# Patient Record
Sex: FEMALE | Race: ASIAN | Employment: UNEMPLOYED | URBAN - METROPOLITAN AREA
[De-identification: names, ages, dates, MRNs, and addresses within clinical notes are randomized per-mention and may not be internally consistent; named-entity substitution may affect disease eponyms.]

---

## 2020-10-22 ENCOUNTER — TELEPHONE (OUTPATIENT)
Dept: PRIMARY CARE CLINIC | Age: 55
End: 2020-10-22

## 2020-10-22 ENCOUNTER — OFFICE VISIT (OUTPATIENT)
Dept: PRIMARY CARE CLINIC | Age: 55
End: 2020-10-22
Payer: COMMERCIAL

## 2020-10-22 VITALS
SYSTOLIC BLOOD PRESSURE: 124 MMHG | OXYGEN SATURATION: 99 % | BODY MASS INDEX: 21.14 KG/M2 | WEIGHT: 112 LBS | RESPIRATION RATE: 14 BRPM | HEART RATE: 90 BPM | HEIGHT: 61 IN | TEMPERATURE: 97.5 F | DIASTOLIC BLOOD PRESSURE: 80 MMHG

## 2020-10-22 DIAGNOSIS — Z00.00 PREVENTATIVE HEALTH CARE: ICD-10-CM

## 2020-10-22 LAB
ALBUMIN SERPL-MCNC: 4.3 G/DL (ref 3.5–4.6)
ALP BLD-CCNC: 81 U/L (ref 40–130)
ALT SERPL-CCNC: 10 U/L (ref 0–33)
ANION GAP SERPL CALCULATED.3IONS-SCNC: 10 MEQ/L (ref 9–15)
AST SERPL-CCNC: 17 U/L (ref 0–35)
BASOPHILS ABSOLUTE: 0.1 K/UL (ref 0–0.2)
BASOPHILS RELATIVE PERCENT: 0.7 %
BILIRUB SERPL-MCNC: 0.4 MG/DL (ref 0.2–0.7)
BUN BLDV-MCNC: 9 MG/DL (ref 6–20)
CALCIUM SERPL-MCNC: 9.2 MG/DL (ref 8.5–9.9)
CHLORIDE BLD-SCNC: 103 MEQ/L (ref 95–107)
CHOLESTEROL, TOTAL: 182 MG/DL (ref 0–199)
CO2: 25 MEQ/L (ref 20–31)
CREAT SERPL-MCNC: 0.6 MG/DL (ref 0.5–0.9)
EOSINOPHILS ABSOLUTE: 0.1 K/UL (ref 0–0.7)
EOSINOPHILS RELATIVE PERCENT: 0.8 %
GFR AFRICAN AMERICAN: >60
GFR NON-AFRICAN AMERICAN: >60
GLOBULIN: 2.8 G/DL (ref 2.3–3.5)
GLUCOSE BLD-MCNC: 92 MG/DL (ref 70–99)
HBA1C MFR BLD: 5.6 % (ref 4.8–5.9)
HCT VFR BLD CALC: 40.4 % (ref 37–47)
HDLC SERPL-MCNC: 65 MG/DL (ref 40–59)
HEMOGLOBIN: 13.5 G/DL (ref 12–16)
LDL CHOLESTEROL CALCULATED: 103 MG/DL (ref 0–129)
LYMPHOCYTES ABSOLUTE: 2.5 K/UL (ref 1–4.8)
LYMPHOCYTES RELATIVE PERCENT: 30.5 %
MCH RBC QN AUTO: 31 PG (ref 27–31.3)
MCHC RBC AUTO-ENTMCNC: 33.5 % (ref 33–37)
MCV RBC AUTO: 92.8 FL (ref 82–100)
MONOCYTES ABSOLUTE: 0.6 K/UL (ref 0.2–0.8)
MONOCYTES RELATIVE PERCENT: 8 %
NEUTROPHILS ABSOLUTE: 4.9 K/UL (ref 1.4–6.5)
NEUTROPHILS RELATIVE PERCENT: 60 %
PDW BLD-RTO: 13.4 % (ref 11.5–14.5)
PLATELET # BLD: 299 K/UL (ref 130–400)
POTASSIUM SERPL-SCNC: 4.6 MEQ/L (ref 3.4–4.9)
RBC # BLD: 4.36 M/UL (ref 4.2–5.4)
SODIUM BLD-SCNC: 138 MEQ/L (ref 135–144)
TOTAL PROTEIN: 7.1 G/DL (ref 6.3–8)
TRIGL SERPL-MCNC: 71 MG/DL (ref 0–150)
TSH REFLEX: 0.93 UIU/ML (ref 0.44–3.86)
WBC # BLD: 8.1 K/UL (ref 4.8–10.8)

## 2020-10-22 PROCEDURE — G8420 CALC BMI NORM PARAMETERS: HCPCS | Performed by: INTERNAL MEDICINE

## 2020-10-22 PROCEDURE — G8484 FLU IMMUNIZE NO ADMIN: HCPCS | Performed by: INTERNAL MEDICINE

## 2020-10-22 PROCEDURE — 1036F TOBACCO NON-USER: CPT | Performed by: INTERNAL MEDICINE

## 2020-10-22 PROCEDURE — 3017F COLORECTAL CA SCREEN DOC REV: CPT | Performed by: INTERNAL MEDICINE

## 2020-10-22 PROCEDURE — 99204 OFFICE O/P NEW MOD 45 MIN: CPT | Performed by: INTERNAL MEDICINE

## 2020-10-22 PROCEDURE — G8427 DOCREV CUR MEDS BY ELIG CLIN: HCPCS | Performed by: INTERNAL MEDICINE

## 2020-10-22 RX ORDER — CITALOPRAM 10 MG/1
10 TABLET ORAL DAILY
Qty: 30 TABLET | Refills: 3 | Status: SHIPPED | OUTPATIENT
Start: 2020-10-22

## 2020-10-22 RX ORDER — HYDROXYZINE PAMOATE 25 MG/1
25 CAPSULE ORAL 3 TIMES DAILY PRN
Qty: 30 CAPSULE | Refills: 1 | Status: SHIPPED | OUTPATIENT
Start: 2020-10-22

## 2020-10-22 RX ORDER — IBUPROFEN 200 MG
CAPSULE ORAL DAILY
COMMUNITY

## 2020-10-22 SDOH — HEALTH STABILITY: MENTAL HEALTH: HOW OFTEN DO YOU HAVE A DRINK CONTAINING ALCOHOL?: NEVER

## 2020-10-22 SDOH — ECONOMIC STABILITY: INCOME INSECURITY: HOW HARD IS IT FOR YOU TO PAY FOR THE VERY BASICS LIKE FOOD, HOUSING, MEDICAL CARE, AND HEATING?: NOT HARD AT ALL

## 2020-10-22 SDOH — ECONOMIC STABILITY: TRANSPORTATION INSECURITY
IN THE PAST 12 MONTHS, HAS LACK OF TRANSPORTATION KEPT YOU FROM MEETINGS, WORK, OR FROM GETTING THINGS NEEDED FOR DAILY LIVING?: NO

## 2020-10-22 SDOH — ECONOMIC STABILITY: TRANSPORTATION INSECURITY
IN THE PAST 12 MONTHS, HAS THE LACK OF TRANSPORTATION KEPT YOU FROM MEDICAL APPOINTMENTS OR FROM GETTING MEDICATIONS?: NO

## 2020-10-22 SDOH — ECONOMIC STABILITY: FOOD INSECURITY: WITHIN THE PAST 12 MONTHS, THE FOOD YOU BOUGHT JUST DIDN'T LAST AND YOU DIDN'T HAVE MONEY TO GET MORE.: NEVER TRUE

## 2020-10-22 SDOH — ECONOMIC STABILITY: FOOD INSECURITY: WITHIN THE PAST 12 MONTHS, YOU WORRIED THAT YOUR FOOD WOULD RUN OUT BEFORE YOU GOT MONEY TO BUY MORE.: NEVER TRUE

## 2020-10-22 ASSESSMENT — PATIENT HEALTH QUESTIONNAIRE - PHQ9
SUM OF ALL RESPONSES TO PHQ QUESTIONS 1-9: 2
1. LITTLE INTEREST OR PLEASURE IN DOING THINGS: 1
SUM OF ALL RESPONSES TO PHQ9 QUESTIONS 1 & 2: 2
SUM OF ALL RESPONSES TO PHQ QUESTIONS 1-9: 2
SUM OF ALL RESPONSES TO PHQ QUESTIONS 1-9: 2
2. FEELING DOWN, DEPRESSED OR HOPELESS: 1

## 2020-10-22 NOTE — PROGRESS NOTES
Subjective:      Patient ID: Cameron Adler is a 63 y.o. female  New patient  HPI  Patient presents to establish care with PCP today. Previous PCP was in [place], [place]. PMHx:  None  Last pap test: 3 yrs ago. Result: claims negative  Last colonoscopy: never        Last mammogram: 3 yrs ago. Result: claims  negative    Last tetanus shot: unsure  Hx zoster vaccination? Will reconsider  ASCVD 10 yr risk: no lipid panel     CC: anxiety x 1 yr  Pt has had continuous anxiety since the past 1 yr. Assoc difficulty sleeping, abd cramps, palpitations, chest tightness. Assoc depression but no suicidal ideations. No loss of interest, no hyperactivity. Attests to loneliness as her  is out of town and daughter is in [employer]. Worried about COVID 23 and upcoming meniscus surgery. History reviewed. No pertinent past medical history. History reviewed. No pertinent surgical history.   Social History     Socioeconomic History    Marital status:      Spouse name: Not on file    Number of children: Not on file    Years of education: Not on file    Highest education level: Not on file   Occupational History    Not on file   Social Needs    Financial resource strain: Not hard at all    Food insecurity     Worry: Never true     Inability: Never true   [employer] needs     Medical: No     Non-medical: No   Tobacco Use    Smoking status: Never Smoker    Smokeless tobacco: Never Used   Substance and Sexual Activity    Alcohol use: Never     Frequency: Never     Binge frequency: Never    Drug use: Never    Sexual activity: Yes     Partners: Male   Lifestyle    Physical activity     Days per week: Not on file     Minutes per session: Not on file    Stress: Not on file   Relationships    Social connections     Talks on phone: Not on file     Gets together: Not on file     Attends Nondenominational service: Not on file     Active member of club or organization: Not on file     Attends meetings of clubs or organizations: Not on file     Relationship status: Not on file    Intimate partner violence     Fear of current or ex partner: Not on file     Emotionally abused: Not on file     Physically abused: Not on file     Forced sexual activity: Not on file   Other Topics Concern    Not on file   Social History Narrative    Not on file     History reviewed. No pertinent family history. Allergies:  Patient has no known allergies. There is no problem list on file for this patient. Current Outpatient Medications on File Prior to Visit   Medication Sig Dispense Refill    calcium carbonate (OYSTER SHELL CALCIUM 500 MG) 1250 (500 Ca) MG tablet Take by mouth daily      B Complex Vitamins (VITAMIN B COMPLEX PO) Take by mouth daily      vitamin D-3 (CHOLECALCIFEROL) 125 MCG (5000 UT) TABS Take by mouth daily       No current facility-administered medications on file prior to visit. Review of Systems   Constitutional: Negative for chills, diaphoresis, fatigue and fever. HENT: Negative for congestion, ear discharge and ear pain. Respiratory: Positive for chest tightness. Negative for cough, shortness of breath and wheezing. Cardiovascular: Negative for chest pain. Gastrointestinal: Positive for abdominal pain. Negative for diarrhea, nausea and vomiting. Endocrine: Negative for cold intolerance and heat intolerance. Genitourinary: Negative for dysuria and frequency. Neurological: Negative for dizziness and light-headedness. Psychiatric/Behavioral: Positive for dysphoric mood and sleep disturbance. Negative for agitation and suicidal ideas. The patient is nervous/anxious. Objective:   /80 (Site: Right Upper Arm, Position: Sitting, Cuff Size: Medium Adult)   Pulse 90   Temp 97.5 °F (36.4 °C) (Oral)   Resp 14   Ht 5' 1\" (1.549 m)   Wt 112 lb (50.8 kg)   SpO2 99%   BMI 21.16 kg/m²     Physical Exam  Constitutional:       General: She is not in acute distress.      Appearance: Normal appearance. She is well-developed. Cardiovascular:      Rate and Rhythm: Normal rate and regular rhythm. Pulses: Normal pulses. Heart sounds: Normal heart sounds. No murmur. Pulmonary:      Effort: Pulmonary effort is normal. No respiratory distress. Breath sounds: Normal breath sounds. No wheezing. Abdominal:      General: Bowel sounds are normal. There is no distension. Palpations: Abdomen is soft. There is no mass. Tenderness: There is no abdominal tenderness. There is no guarding or rebound. Neurological:      Mental Status: She is alert. Psychiatric:         Behavior: Behavior normal.         Thought Content: Thought content normal.         Judgment: Judgment normal.      Comments: anxious       Assessment:       Diagnosis Orders   1. Adjustment disorder with mixed anxiety and depressed mood  citalopram (CELEXA) 10 MG tablet    hydrOXYzine (VISTARIL) 25 MG capsule   2. Encounter for screening mammogram for malignant neoplasm of breast  SHELBIE DIGITAL SCREEN W OR WO CAD BILATERAL   3. [facility] health care  Cologuard (For External Results Only)    CBC With Auto Differential    Comprehensive Metabolic Panel    TSH with Reflex    HIV-1,2 Combo Ag/Ab By [facility], Reflexive Panel    Hemoglobin A1C    Lipid Panel     Plan:      Orders Placed This Encounter   Procedures    Cologuard (For External Results Only)     This test is performed by an external laboratory and is used for result attachment only. It is required that this order requisition be faxed to: [email or web address] @ [phone]. See [email or web address] for further information.      Standing Status:   Future     Standing Expiration Date:   10/22/2021    SHELBIE DIGITAL SCREEN W OR WO CAD BILATERAL     Standing Status:   Future     Standing Expiration Date:   12/22/2021     Order Specific Question:   Reason for exam:     Answer:   screening    CBC With Auto Differential     Standing Status:   Future     Number of Occurrences: 1     Standing Expiration Date:   10/22/2021    Comprehensive Metabolic Panel     Standing Status:   Future     Number of Occurrences:   1     Standing Expiration Date:   10/22/2021    TSH with Reflex     Standing Status:   Future     Number of Occurrences:   1     Standing Expiration Date:   10/22/2021    HIV-1,2 Combo Ag/Ab By [facility], Reflexive Panel     Standing Status:   Future     Number of Occurrences:   1     Standing Expiration Date:   10/22/2021    Hemoglobin A1C     Standing Status:   Future     Number of Occurrences:   1     Standing Expiration Date:   10/22/2021    Lipid Panel     Standing Status:   Future     Number of Occurrences:   1     Standing Expiration Date:   10/22/2021     Order Specific Question:   Is Patient Fasting?/# of Hours     Answer:   yes     Orders Placed This Encounter   Medications    citalopram (CELEXA) 10 MG tablet     Sig: Take 1 tablet by mouth daily     Dispense:  30 tablet     Refill:  3    hydrOXYzine (VISTARIL) 25 MG capsule     Sig: Take 1 capsule by mouth 3 times daily as needed for Anxiety     Dispense:  30 capsule     Refill:  1     Return in about 2 weeks (around 11/5/2020) for to r/o sucidal ideation, assessment of response to treatment.

## 2020-10-22 NOTE — TELEPHONE ENCOUNTER
Patient calling back in to inform you she forgot to tell you she also takes Aspirin 80 mg so asking should she continue taking this or discontinue taking it  Please advise

## 2020-10-24 ASSESSMENT — ENCOUNTER SYMPTOMS
NAUSEA: 0
DIARRHEA: 0
COUGH: 0
ABDOMINAL PAIN: 1
WHEEZING: 0
CHEST TIGHTNESS: 1
VOMITING: 0
SHORTNESS OF BREATH: 0

## 2020-10-25 LAB — HIV 1,2 COMBO ANTIGEN/ANTIBODY: NEGATIVE

## 2020-11-06 ENCOUNTER — TELEPHONE (OUTPATIENT)
Dept: PRIMARY CARE CLINIC | Age: 55
End: 2020-11-06

## 2020-11-06 NOTE — TELEPHONE ENCOUNTER
Call placed to Patient in [facility]. Patient hung up before [facility] number [phone] could be provided for appt scheduling.

## 2021-01-16 ENCOUNTER — NURSE TRIAGE (OUTPATIENT)
Dept: OTHER | Facility: CLINIC | Age: 56
End: 2021-01-16

## 2021-01-16 NOTE — TELEPHONE ENCOUNTER
Reason for Disposition   [1] MODERATE pain (e.g., interferes with normal activities) AND [2] pain comes and goes (cramps) AND [3] present > 24 hours  (Exception: pain with Vomiting or Diarrhea - see that Guideline)    Answer Assessment - Initial Assessment Questions  1. LOCATION: \"Where does it hurt? \"       Center below belly button    2. RADIATION: \"Does the pain shoot anywhere else? \" (e.g., chest, back)      No    3. ONSET: \"When did the pain begin? \" (e.g., minutes, hours or days ago)       2 days    4. SUDDEN: \"Gradual or sudden onset? \"       gradual    5. PATTERN \"Does the pain come and go, or is it constant? \"     - If constant: \"Is it getting better, staying the same, or worsening? \"       (Note: Constant means the pain never goes away completely; most serious pain is constant and it progresses)      - If intermittent: \"How long does it last?\" \"Do you have pain now? \"      (Note: Intermittent means the pain goes away completely between bouts)      Intermittent 5/10 up to 8/10    6. SEVERITY: \"How bad is the pain? \"  (e.g., Scale 1-10; mild, moderate, or severe)    - MILD (1-3): doesn't interfere with normal activities, abdomen soft and not tender to touch     - MODERATE (4-7): interferes with normal activities or awakens from sleep, tender to touch     - SEVERE (8-10): excruciating pain, doubled over, unable to do any normal activities       5-8/10    7. RECURRENT SYMPTOM: \"Have you ever had this type of abdominal pain before? \" If so, ask: \"When was the last time? \" and \"What happened that time? \"       No    8. CAUSE: \"What do you think is causing the abdominal pain? \"      Unsure    9. RELIEVING/AGGRAVATING FACTORS: \"What makes it better or worse? \" (e.g., movement, antacids, bowel movement)      No    10. OTHER SYMPTOMS: \"Has there been any vomiting, diarrhea, constipation, or urine problems? \"        Constipation no longer    11. PREGNANCY: \"Is there any chance you are pregnant? \" \"When was your last menstrual period? \"        No    Protocols used: ABDOMINAL PAIN [facility]    Brief description of triage: abdominal cramping    Triage indicates for patient to see PCP within 24 hours. Care advice provided, patient verbalizes understanding; denies any other questions or concerns; instructed to call back for any new or worsening symptoms. This triage is a result of a call to [address] [place] [address]. Please do not respond to the triage nurse through this encounter. Any subsequent communication should be directly with the patient.

## 2022-05-17 ENCOUNTER — COMMUNITY OUTREACH (OUTPATIENT)
Dept: INTERNAL MEDICINE | Age: 57
End: 2022-05-17

## 2022-05-18 NOTE — PROGRESS NOTES
Patient's HM shows they are overdue for Mammogram, Colorectal Screening and Cervical Cancer Screening. [employer] and  files searched without success. No results to attach to order nor HM updated. No upcoming apt; Pt [employer] is inactive; Will f/u via phone call.

## 2022-06-08 ENCOUNTER — COMMUNITY OUTREACH (OUTPATIENT)
Dept: PRIMARY CARE CLINIC | Age: 57
End: 2022-06-08

## 2022-06-08 NOTE — PROGRESS NOTES
Patient's HM shows they are overdue for Mammogram, Colorectal Screening and Cervical Cancer Screening. [employer] website and  files searched without success. No results to attach to order nor HM updated. No upcoming apt. Called pt;  No answer; LDVM in regards to scheduling annual testing

## 2024-11-25 ENCOUNTER — ANCILLARY PROCEDURE (OUTPATIENT)
Dept: URGENT CARE | Age: 59
End: 2024-11-25
Payer: COMMERCIAL

## 2024-11-25 ENCOUNTER — OFFICE VISIT (OUTPATIENT)
Dept: URGENT CARE | Age: 59
End: 2024-11-25
Payer: COMMERCIAL

## 2024-11-25 VITALS
BODY MASS INDEX: 23.41 KG/M2 | WEIGHT: 124 LBS | RESPIRATION RATE: 20 BRPM | DIASTOLIC BLOOD PRESSURE: 78 MMHG | HEART RATE: 76 BPM | OXYGEN SATURATION: 97 % | SYSTOLIC BLOOD PRESSURE: 121 MMHG | HEIGHT: 61 IN | TEMPERATURE: 98.2 F

## 2024-11-25 DIAGNOSIS — S62.101A CLOSED FRACTURE OF RIGHT WRIST, INITIAL ENCOUNTER: ICD-10-CM

## 2024-11-25 DIAGNOSIS — M54.50 ACUTE MIDLINE LOW BACK PAIN WITHOUT SCIATICA: ICD-10-CM

## 2024-11-25 DIAGNOSIS — M54.50 ACUTE MIDLINE LOW BACK PAIN WITHOUT SCIATICA: Primary | ICD-10-CM

## 2024-11-25 DIAGNOSIS — M25.531 PAIN IN RIGHT WRIST: ICD-10-CM

## 2024-11-25 PROCEDURE — 72072 X-RAY EXAM THORAC SPINE 3VWS: CPT | Performed by: NURSE PRACTITIONER

## 2024-11-25 PROCEDURE — 73110 X-RAY EXAM OF WRIST: CPT | Mod: RIGHT SIDE | Performed by: NURSE PRACTITIONER

## 2024-11-25 PROCEDURE — 72100 X-RAY EXAM L-S SPINE 2/3 VWS: CPT | Performed by: NURSE PRACTITIONER

## 2024-11-25 RX ORDER — ASPIRIN 81 MG/1
81 TABLET ORAL DAILY
COMMUNITY

## 2024-11-25 RX ORDER — ASCORBIC ACID 500 MG
500 TABLET ORAL DAILY
COMMUNITY

## 2024-11-25 RX ORDER — BISMUTH SUBSALICYLATE 262 MG
1 TABLET,CHEWABLE ORAL DAILY
COMMUNITY

## 2024-11-25 ASSESSMENT — PAIN SCALES - GENERAL: PAINLEVEL_OUTOF10: 7

## 2024-11-25 NOTE — PATIENT INSTRUCTIONS
Strain to back:  - Xray completed, no acute fractures  - Rest the back  - Heat and Ice; Tylenol/Motrin as needed for pain  - Pain does not radiated into the legs; no weakness in the legs  - Advised on s/s to seek emergent care for  - No heavy lifting or repetitive pushing/pulling    Right wrist fracture:  - Wrist splint applied  - Pt to follow-up with ortho surgery tomorrow; per spouse has ortho doctor already and will call them immediately tomorrow morning for follow-up and further instructions; stressed importance of follow-up tomorrow with ortho  - Keep splint on wrist at this time until seen by ortho  - No lifting with the right hand  - Ice; NSAID/Tylenol as needed for pain  - Advised on s/s to seek emergent care for

## 2024-11-25 NOTE — PROGRESS NOTES
"Subjective   Patient ID: Austin Woo is a 59 y.o. female. They present today with a chief complaint of Injury (Fell off ladder injuring back and right wrist today).    History of Present Illness  Pt states she fell from the 4th step of a ladder in her garage and landed on her back and right wrist around 11 am this morning. She denies hitting her head. Pain to the right wrist and mid to lower back. Right wrist swelling. No numbness or tingling in the wrist. Denies numbness or tingling in the lower extremities, no weakness in the legs, no incontinence. She denies SOB, CP or pain with deep inspiration. No other associated symptoms or concerns to address.           Past Medical History  Allergies as of 11/25/2024    (No Known Allergies)       (Not in a hospital admission)       No past medical history on file.    No past surgical history on file.     reports that she has never smoked. She does not have any smokeless tobacco history on file.    Review of Systems  Review of Systems     10 point ROS completed and all are negative other than what is stated in the current HPI                            Objective    Vitals:    11/25/24 1754   BP: 121/78   Pulse: 76   Resp: 20   Temp: 36.8 °C (98.2 °F)   TempSrc: Oral   SpO2: 97%   Weight: 56.2 kg (124 lb)   Height: 1.549 m (5' 1\")     No LMP recorded.    Physical Exam  Vitals and nursing note reviewed.   Constitutional:       Appearance: Normal appearance.   Musculoskeletal:      Right wrist: Swelling and tenderness present. No deformity, effusion, lacerations, snuff box tenderness or crepitus. Decreased range of motion. Normal pulse.      Left wrist: Normal.      Cervical back: Normal.      Thoracic back: Tenderness present. No swelling, edema or spasms.      Lumbar back: Tenderness present. No swelling or spasms. Normal range of motion. Negative right straight leg raise test and negative left straight leg raise test.        Back:       Comments: Diffuse pain on palpation " to the right wrist; (+) radial pulse; cap refill <2 sec.   Neurological:      Mental Status: She is alert.         Procedures    Point of Care Test & Imaging Results from this visit  No results found for this visit on 11/25/24.   XR thoracic spine 3 views    Result Date: 11/25/2024  Interpreted By:  Otis Mak, STUDY: XR THORACIC SPINE 3 VIEWS; ;  11/25/2024 6:45 pm   INDICATION: Signs/Symptoms:fall from ladder; back pain.   ,M54.50 Low back pain, unspecified   COMPARISON: None.   ACCESSION NUMBER(S): [id number]   ORDERING CLINICIAN: ROCKY RED   FINDINGS: Thoracic spine, two views   There is no fracture. There is no spondylolisthesis. Minimal osteophytosis without disc space narrowing throughout the thoracic spine. The prevertebral soft tissues are within normal limits.       Minimal spondylotic changes. No acute abnormality   MACRO: None   Signed by: Otis Mak 11/25/2024 6:52 PM Dictation workstation:   [id number]    XR lumbar spine 2-3 views    Result Date: 11/25/2024  Interpreted By:  Otis Mak, STUDY: XR LUMBAR SPINE 2-3 VIEWS; ;  11/25/2024 6:45 pm   INDICATION: Signs/Symptoms:fall from ladder, back pain.   ,M54.50 Low back pain, unspecified   COMPARISON: 08/12/2014   ACCESSION NUMBER(S): [id number]   ORDERING CLINICIAN: ROCKY RED   FINDINGS: Lumbar Spine, two views   There is mild facet disease lower lumbar spine. There is mild osteophytosis in the lower lumbar spine as well. There is no disc space narrowing. There is no fracture or spondylolisthesis       This mild spondylosis and mild facet disease lower lumbar spine     MACRO: None   Signed by: Otis Mak 11/25/2024 6:52 PM Dictation workstation:   [id number]    XR wrist right 3+ views    Result Date: 11/25/2024  Interpreted By:  Otis Mak, STUDY: XR WRIST RIGHT 3+ VIEWS; ;  11/25/2024 6:45 pm   INDICATION: Signs/Symptoms:Fell from ladder and came back onto the right hand; pain in wrist and swelling.    ,M25.531 Pain in right wrist   COMPARISON: None.   ACCESSION NUMBER(S): [id number]   ORDERING CLINICIAN: ROCKY RED   FINDINGS: Right wrist, three views   There is a comminuted fracture through the distal radius with intra-articular extension. There is mild impaction and mild displacement of the fracture fragments. There is no dislocation. Soft tissue edema about the wrist. There is no significant degenerative changes       Comminuted, mildly displaced and mildly impacted distal radial fracture deformity. Soft tissue edema.     MACRO: None   Signed by: Otis Mak 11/25/2024 6:51 PM Dictation workstation:   [id number]     Diagnostic study results (if any) were reviewed by PAOLA Arndt.    Assessment/Plan   Allergies, medications, history, and pertinent labs/EKGs/Imaging reviewed by PAOLA Arndt.     Medical Decision Making  Strain to back:  - Xray completed, no acute fractures  - Rest the back  - Heat and Ice; Tylenol/Motrin as needed for pain  - Pain does not radiated into the legs; no weakness in the legs  - Advised on s/s to seek emergent care for  - No heavy lifting or repetitive pushing/pulling    Right wrist fracture:  - Wrist splint applied  - Pt to follow-up with ortho surgery tomorrow; per spouse has ortho doctor already and will call them immediately tomorrow morning for follow-up and further instructions; stressed importance of follow-up tomorrow with ortho  - Keep splint on wrist at this time until seen by ortho  - No lifting with the right hand  - Ice; NSAID/Tylenol as needed for pain  - Advised on s/s to seek emergent care for    Orders and Diagnoses  Diagnoses and all orders for this visit:  Acute midline low back pain without sciatica  -     XR thoracic spine 3 views; Future  -     XR lumbar spine 2-3 views; Future  Pain in right wrist  -     XR wrist right 3+ views; Future  Closed fracture of right wrist, initial encounter  -     Referral to  Orthopaedic Surgery; Future      Medical Admin Record      Patient disposition: Home    Electronically signed by PAOLA Arndt  9:05 PM